# Patient Record
Sex: FEMALE | Race: WHITE | NOT HISPANIC OR LATINO | Employment: FULL TIME | URBAN - METROPOLITAN AREA
[De-identification: names, ages, dates, MRNs, and addresses within clinical notes are randomized per-mention and may not be internally consistent; named-entity substitution may affect disease eponyms.]

---

## 2017-11-29 ENCOUNTER — OFFICE VISIT - HEALTHEAST (OUTPATIENT)
Dept: FAMILY MEDICINE | Facility: CLINIC | Age: 27
End: 2017-11-29

## 2017-11-29 DIAGNOSIS — J02.0 STREP PHARYNGITIS: ICD-10-CM

## 2017-11-29 ASSESSMENT — MIFFLIN-ST. JEOR: SCORE: 1358.18

## 2017-12-19 ENCOUNTER — COMMUNICATION - HEALTHEAST (OUTPATIENT)
Dept: FAMILY MEDICINE | Facility: CLINIC | Age: 27
End: 2017-12-19

## 2018-11-27 ENCOUNTER — AMBULATORY - HEALTHEAST (OUTPATIENT)
Dept: NURSING | Facility: CLINIC | Age: 28
End: 2018-11-27

## 2019-01-01 ENCOUNTER — TRANSFERRED RECORDS (OUTPATIENT)
Dept: HEALTH INFORMATION MANAGEMENT | Facility: CLINIC | Age: 29
End: 2019-01-01

## 2019-01-01 LAB — PAP SMEAR - HIM PATIENT REPORTED: NORMAL

## 2019-03-29 ENCOUNTER — OFFICE VISIT - HEALTHEAST (OUTPATIENT)
Dept: FAMILY MEDICINE | Facility: CLINIC | Age: 29
End: 2019-03-29

## 2019-03-29 DIAGNOSIS — J02.9 SORE THROAT: ICD-10-CM

## 2019-03-29 DIAGNOSIS — J00 ACUTE NASOPHARYNGITIS: ICD-10-CM

## 2019-03-29 LAB — DEPRECATED S PYO AG THROAT QL EIA: NORMAL

## 2019-03-30 LAB — GROUP A STREP BY PCR: NORMAL

## 2019-10-23 ENCOUNTER — OFFICE VISIT (OUTPATIENT)
Dept: FAMILY MEDICINE | Facility: CLINIC | Age: 29
End: 2019-10-23
Payer: COMMERCIAL

## 2019-10-23 VITALS
HEART RATE: 72 BPM | RESPIRATION RATE: 18 BRPM | DIASTOLIC BLOOD PRESSURE: 64 MMHG | TEMPERATURE: 98.4 F | SYSTOLIC BLOOD PRESSURE: 118 MMHG | OXYGEN SATURATION: 98 %

## 2019-10-23 DIAGNOSIS — Z33.1 PREGNANT STATE, INCIDENTAL: ICD-10-CM

## 2019-10-23 DIAGNOSIS — J06.9 UPPER RESPIRATORY INFECTION, VIRAL: Primary | ICD-10-CM

## 2019-10-23 PROCEDURE — 99203 OFFICE O/P NEW LOW 30 MIN: CPT | Performed by: FAMILY MEDICINE

## 2019-10-23 NOTE — PROGRESS NOTES
Subjective     Christy Sharma is a 29 year old female who presents to clinic today for the following health issues:    HPI   Acute Illness   Acute illness concerns: Cough  Onset:  x 10 days - Patient 24 weeks pregnant      Fever: no     Chills/Sweats: no     Headache (location?): YES- Sinus and forehead    Sinus Pressure:YES- facial pain    Conjunctivitis:  no    Ear Pain: no    Rhinorrhea: YES    Congestion: YES    Sore Throat: YES     Cough: YES - Was productive not is dry    Wheeze: no     Decreased Appetite: no     Nausea: no     Vomiting: no     Diarrhea:  YES- Unable to tell if due to cold or pregnancy    Dysuria/Freq.: no     Fatigue/Achiness: YES- Unable to tell if due to cold or pregnancy    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  24 weeks pregnant.  Hot water and lemon and honey.  No fevers.  Pain behind sinuses last week, now better.  Now sore throat.  Dry throat.  Hacking cough, keeps going for awhile.  Hurts to cough, feels nauseated.  No shortness of breath.  No smoke or vape.  No fevers.    Pregnant.  Baby not moving as much, can we listen to heart beat?  Still moving, just not quite as frequent.    Reviewed and updated as needed this visit by Provider  Tobacco  Allergies  Meds  Problems  Med Hx  Surg Hx  Fam Hx         Review of Systems   ROS COMP: Constitutional, HEENT, cardiovascular, pulmonary, gi and gu systems are negative, except as otherwise noted.      Objective    /64   Pulse 72   Temp 98.4  F (36.9  C)   Resp 18   SpO2 98%   There is no height or weight on file to calculate BMI.  Physical Exam   GENERAL: healthy, alert and no distress  EYES: Eyes grossly normal to inspection, PERRL and conjunctivae and sclerae normal  HENT: ear canals and TM's normal, nose and mouth without ulcers or lesions  NECK: no adenopathy, no asymmetry, masses, or scars and thyroid normal to palpation  RESP: lungs  clear to auscultation - no rales, rhonchi or wheezes  CV: regular rate and rhythm, normal S1 S2, no S3 or S4, no murmur, click or rub, no peripheral edema and peripheral pulses strong  ABDOMEN: soft, nontender, without hepatosplenomegaly or masses, bowel sounds normal and gravid.  -150s.  Listened for 60 seconds, no decels.        Assessment & Plan     Christy was seen today for uri.    Diagnoses and all orders for this visit:    Upper respiratory infection, viral    Afrin x 3 days.    Steam, heat to sinuses.    Time.    If worsens or fevers or lasts more than 14 days without improvement, could consider antibiotics.    Reassured.  over the counter medications.    Pregnant state, incidental    FHT normal today on auscultation.    Reviewed with patient to call her OB/GYN or go to triage if reduced fetal movement.      (Patient reports normal pap last year - sent to abstracting)  Discussed with patient, all questions answered, in agreement with this plan, will return or seek further care if not improving or worsening.      Return in about 1 week (around 10/30/2019) for and sooner if worsening or not improving.    Lianna Ayala MD  [facility]

## 2020-01-20 ENCOUNTER — HOSPITAL ENCOUNTER (OUTPATIENT)
Dept: OBGYN | Facility: CLINIC | Age: 30
Discharge: HOME | End: 2020-01-20
Attending: OBSTETRICS & GYNECOLOGY | Admitting: OBSTETRICS & GYNECOLOGY

## 2020-01-20 LAB
APTT PPP: 25 SECONDS (ref 24–37)
BASOPHILS # BLD AUTO: 0.1 THOU/UL (ref 0–0.2)
BASOPHILS NFR BLD AUTO: 0 % (ref 0–2)
EOSINOPHIL # BLD AUTO: 0.1 THOU/UL (ref 0–0.4)
EOSINOPHIL NFR BLD AUTO: 1 % (ref 0–6)
ERYTHROCYTE [DISTWIDTH] IN BLOOD BY AUTOMATED COUNT: 13.2 % (ref 11–14.5)
FETAL RBC % LFV: 0 %
FIBRINOGEN PPP-MCNC: 497 MG/DL (ref 170–410)
HCT VFR BLD AUTO: 33.3 % (ref 35–47)
HGB BLD-MCNC: 11.9 G/DL (ref 12–16)
INR PPP: 0.98 (ref 0.9–1.1)
LYMPHOCYTES # BLD AUTO: 1.9 THOU/UL (ref 0.8–4.4)
LYMPHOCYTES NFR BLD AUTO: 14 % (ref 20–40)
MCH RBC QN AUTO: 32.1 PG (ref 27–34)
MCHC RBC AUTO-ENTMCNC: 35.7 G/DL (ref 32–36)
MCV RBC AUTO: 90 FL (ref 80–100)
MONOCYTES # BLD AUTO: 0.7 THOU/UL (ref 0–0.9)
MONOCYTES NFR BLD AUTO: 5 % (ref 2–10)
NEUTROPHILS # BLD AUTO: 10.4 THOU/UL (ref 2–7.7)
NEUTROPHILS NFR BLD AUTO: 79 % (ref 50–70)
PLATELET # BLD AUTO: 241 THOU/UL (ref 140–440)
PMV BLD AUTO: 11 FL (ref 8.5–12.5)
RBC # BLD AUTO: 3.71 MILL/UL (ref 3.8–5.4)
WBC: 13.2 THOU/UL (ref 4–11)

## 2020-03-11 ENCOUNTER — HEALTH MAINTENANCE LETTER (OUTPATIENT)
Age: 30
End: 2020-03-11

## 2021-01-03 ENCOUNTER — HEALTH MAINTENANCE LETTER (OUTPATIENT)
Age: 31
End: 2021-01-03

## 2021-05-02 ENCOUNTER — HEALTH MAINTENANCE LETTER (OUTPATIENT)
Age: 31
End: 2021-05-02

## 2021-05-27 NOTE — PATIENT INSTRUCTIONS - HE
1.  The visit diagnosis is acute viral nasopharyngitis or the common cold  2.  The rapid strep test was negative.  Strep culture is pending.  3.  I recommend treating the sore throat with either acetaminophen or ibuprofen.  4.  Return to the walk-in clinic or to your primary provider if symptoms become worse

## 2021-05-27 NOTE — PROGRESS NOTES
Subjective:      Patient ID: Christy Loza is a 28 y.o. female.    Chief Complaint:    28-year-old female with a 3-4-day history of sore throat, upper respiratory congestion and cough.  The patient was exposed to strep pharyngitis last week and wants to rule that out.  She has not had a significant fever.  There is been no shortness of breath.  No sinus pain or ear pain.          No past medical history on file.    Past Surgical History:   Procedure Laterality Date     TONSILLECTOMY AND ADENOIDECTOMY         Family History   Problem Relation Age of Onset     Alcohol abuse Paternal Uncle      Breast cancer Maternal Grandmother      Alcohol abuse Maternal Grandfather      Clotting disorder Maternal Grandfather        Social History     Tobacco Use     Smoking status: Never Smoker     Smokeless tobacco: Never Used   Substance Use Topics     Alcohol use: Not on file     Drug use: Not on file       Review of Systems   HENT: Positive for congestion, rhinorrhea and sore throat.    Respiratory: Positive for cough.    All other systems reviewed and are negative.      Objective:     /77   Pulse 71   Temp 98.4  F (36.9  C) (Oral)   Resp 14   Wt 146 lb 1.6 oz (66.3 kg)   SpO2 98%   BMI 23.58 kg/m      Physical Exam   Constitutional: She is oriented to person, place, and time. She appears well-developed and well-nourished. No distress.   HENT:   Head: Normocephalic and atraumatic.   Right Ear: Hearing, tympanic membrane, external ear and ear canal normal.   Left Ear: Hearing, tympanic membrane, external ear and ear canal normal.   Nose: Mucosal edema and rhinorrhea present.   Mouth/Throat: Uvula is midline and oropharynx is clear and moist. No oropharyngeal exudate, posterior oropharyngeal edema, posterior oropharyngeal erythema or tonsillar abscesses.   Eyes: Conjunctivae and EOM are normal. Pupils are equal, round, and reactive to light. Right eye exhibits no discharge. Left eye exhibits no discharge.    Neck: Normal range of motion. Neck supple.   Cardiovascular: Normal rate, regular rhythm and normal heart sounds.   Pulmonary/Chest: Effort normal and breath sounds normal. No respiratory distress.   Lymphadenopathy:     She has no cervical adenopathy.   Neurological: She is alert and oriented to person, place, and time.   Skin: Skin is warm and dry. No rash noted. She is not diaphoretic.   Psychiatric: She has a normal mood and affect. Her behavior is normal. Judgment and thought content normal.   Nursing note and vitals reviewed.    Recent Results (from the past 24 hour(s))   Rapid Strep A Screen-Throat   Result Value Ref Range    Rapid Strep A Antigen No Group A Strep detected, presumptive negative No Group A Strep detected, presumptive negative       Assessment:       The primary encounter diagnosis was Sore throat. A diagnosis of Acute nasopharyngitis was also pertinent to this visit.    Plan:     1.  The visit diagnosis is acute viral nasopharyngitis or the common cold  2.  The rapid strep test was negative.  Strep culture is pending.  3.  I recommend treating the sore throat with either acetaminophen or ibuprofen.  4.  Return to the walk-in clinic or to your primary provider if symptoms become worse

## 2021-05-31 VITALS — HEIGHT: 66 IN | WEIGHT: 137 LBS | BODY MASS INDEX: 22.02 KG/M2

## 2021-06-02 VITALS — WEIGHT: 146.1 LBS | BODY MASS INDEX: 23.58 KG/M2
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
## 2021-06-05 NOTE — PLAN OF CARE
"Pt requesting to discharge at 1430.  Dr Alegria updated and recommending pt to stay for full four hours of EFM, but \" I won't make her sign an AMA, I will discharge her but that is not what I am recommending.\"  Pt updated and requesting to leave at 1430 instead 1520.  Will follow up with Dr Worley this week or sooner if decreased fetal movement, bleeding or any concerns.  "

## 2021-06-14 NOTE — PROGRESS NOTES
"OFFICE VISIT - FAMILY MEDICINE     ASSESSMENT AND PLAN     1. Strep pharyngitis  Rapid Strep A Screen-Throat    amoxicillin (AMOXIL) 875 MG tablet   Continue with good hydration, extra vitamin C, cough drops as needed, take the prescribed antibiotic as directed, possible side effect discussed.  Return if not improving.  Discuss about giving 24 hours after starting antibiotic before she can return to contact with other people at work.  She will follow-up if she felt improved.    CHIEF COMPLAINT   Sore Throat and Cough    HPI   Christy Loza is a 27 y.o. female.  Updated [facility] - Needs PAP Records?    URI symptoms started about 2 weeks ago, with sore throat, mild body aches and headaches, she thought that she was doing fine with symptomatic care, but about a week ago she started noticing recurrence of her sore throat with a swollen gland on the right neck, associated with a dry cough.  No fever no chills.  But generalized malaise of fatigue.  She traveled to [place] about 2 weeks ago, she visited [place] ,[facility] and  [place].  Because of her ongoing current symptoms, she is unable to fully work as a .  She has a history of tonsillectomy.  Maternal aunt has breast cancer.  Patient just recently moved to [place] from [place], she used be seen at the [facility] group.  Review of Systems As per HPI, otherwise negative.    OBJECTIVE   /62  Temp 98.6  F (37  C) (Oral)   Ht 5' 6\" (1.676 m)  Wt 137 lb (62.1 kg)  BMI 22.11 kg/m2  Physical Exam   Constitutional: She is oriented to person, place, and time. She appears well-developed and well-nourished.   HENT:   Head: Normocephalic and atraumatic.   Mouth/Throat: Posterior oropharyngeal erythema present.   Neck: Normal range of motion. Neck supple. No JVD present. No tracheal deviation present. No thyromegaly present.   Cardiovascular: Normal rate, regular rhythm, normal heart sounds and intact distal pulses.  Exam reveals no gallop " and no friction rub.    No murmur heard.  Pulmonary/Chest: Effort normal and breath sounds normal. No respiratory distress. She has no wheezes. She has no rales.   Musculoskeletal: She exhibits no edema or tenderness.   Lymphadenopathy:     She has no cervical adenopathy.   Neurological: She is alert and oriented to person, place, and time. Coordination normal.   Psychiatric: She has a normal mood and affect. Judgment and thought content normal.       PFSH   No family history on file.  Social History     Social History     Marital status:      Spouse name: N/A     Number of children: N/A     Years of education: N/A     Occupational History     Not on file.     Social History Main Topics     Smoking status: Never Smoker     Smokeless tobacco: Never Used     Alcohol use Not on file     Drug use: Not on file     Sexual activity: Not on file     Other Topics Concern     Not on file     Social History Narrative     No narrative on file     Relevant history was reviewed with the patient today, unless noted in HPI, nothing is pertinent for this visit.  [facility]   There are no active problems to display for this patient.    Past Surgical History:   Procedure Laterality Date     TONSILLECTOMY AND ADENOIDECTOMY         RESULTS/CONSULTS (Lab/Rad)     Recent Results (from the past 168 hour(s))   Rapid Strep A Screen-Throat   Result Value Ref Range    Rapid Strep A Antigen Group A Strep detected (!) No Group A Strep detected, presumptive negative     No results found.  MEDICATIONS     No current outpatient prescriptions on file prior to visit.     No current facility-administered medications on file prior to visit.        HEALTH MAINTENANCE / SCREENING   PHQ-2 Total Score: 0 (11/29/2017  8:44 AM), No Data Recorded,No Data Recorded  Immunization History   Administered Date(s) Administered     DTaP / HiB 03/27/1991     DTaP / HiB / IPV 01/17/1991     DTaP / IPV 1990, 03/23/1992, 07/07/1997     HPV Quadrivalent 11/21/2006,  03/07/2007, 06/13/2007     Hep A, Adult IM (19yr & older) 01/04/2011     Hep B, Peds or Adolescent 08/28/1998, 10/16/1998, 07/16/1999     HiB, historic,unspecified 06/21/1991, 12/20/1991     Influenza, inj, historic,unspecified 10/07/2013, 09/30/2014     Influenza,seasonal, Inj IIV3 11/21/2006, 09/28/2015     MMR 12/20/1991, 04/14/2003     Meningococcal MCV4P 08/08/2006     Tdap 04/14/2003, 07/15/2009     Typhoid Live, Oral 08/24/2011     Typhoid, Inj, Inactive 08/24/2011, 11/07/2013     Health Maintenance   Topic     TD 18+ HE      TDAP ADULT ONE TIME DOSE      ADVANCE DIRECTIVES DISCUSSED WITH PATIENT      PAP SMEAR      INFLUENZA VACCINE RULE BASED (1)       Ba Cortes MD  Family Medicine, [facility]     This note was dictated using a voice recognition software.  Any grammatical or context distortion are unintentional and inherent to the software.

## 2021-10-17 ENCOUNTER — HEALTH MAINTENANCE LETTER (OUTPATIENT)
Age: 31
End: 2021-10-17

## 2022-05-29 ENCOUNTER — HEALTH MAINTENANCE LETTER (OUTPATIENT)
Age: 32
End: 2022-05-29

## 2022-10-02 ENCOUNTER — HEALTH MAINTENANCE LETTER (OUTPATIENT)
Age: 32
End: 2022-10-02

## 2023-06-04 ENCOUNTER — HEALTH MAINTENANCE LETTER (OUTPATIENT)
Age: 33
End: 2023-06-04